# Patient Record
Sex: FEMALE | Race: ASIAN | NOT HISPANIC OR LATINO | Employment: FULL TIME | URBAN - METROPOLITAN AREA
[De-identification: names, ages, dates, MRNs, and addresses within clinical notes are randomized per-mention and may not be internally consistent; named-entity substitution may affect disease eponyms.]

---

## 2019-07-12 ENCOUNTER — OFFICE VISIT - HEALTHEAST (OUTPATIENT)
Dept: FAMILY MEDICINE | Facility: CLINIC | Age: 39
End: 2019-07-12

## 2019-07-12 ENCOUNTER — AMBULATORY - HEALTHEAST (OUTPATIENT)
Dept: FAMILY MEDICINE | Facility: CLINIC | Age: 39
End: 2019-07-12

## 2019-07-12 ENCOUNTER — COMMUNICATION - HEALTHEAST (OUTPATIENT)
Dept: FAMILY MEDICINE | Facility: CLINIC | Age: 39
End: 2019-07-12

## 2019-07-12 DIAGNOSIS — D64.9 ANEMIA, UNSPECIFIED TYPE: ICD-10-CM

## 2019-07-12 DIAGNOSIS — R42 DIZZINESS: ICD-10-CM

## 2019-07-12 DIAGNOSIS — R51.9 ACUTE NONINTRACTABLE HEADACHE, UNSPECIFIED HEADACHE TYPE: ICD-10-CM

## 2019-07-12 LAB
ANION GAP SERPL CALCULATED.3IONS-SCNC: 11 MMOL/L (ref 5–18)
BUN SERPL-MCNC: 9 MG/DL (ref 8–22)
CALCIUM SERPL-MCNC: 9.4 MG/DL (ref 8.5–10.5)
CHLORIDE BLD-SCNC: 106 MMOL/L (ref 98–107)
CO2 SERPL-SCNC: 23 MMOL/L (ref 22–31)
CREAT SERPL-MCNC: 0.67 MG/DL (ref 0.6–1.1)
ERYTHROCYTE [DISTWIDTH] IN BLOOD BY AUTOMATED COUNT: 17.8 % (ref 11–14.5)
ERYTHROCYTE [SEDIMENTATION RATE] IN BLOOD BY WESTERGREN METHOD: 13 MM/HR (ref 0–20)
FERRITIN SERPL-MCNC: 4 NG/ML (ref 10–130)
GFR SERPL CREATININE-BSD FRML MDRD: >60 ML/MIN/1.73M2
GLUCOSE BLD-MCNC: 122 MG/DL (ref 70–125)
HBA1C MFR BLD: 5.8 % (ref 3.5–6)
HCT VFR BLD AUTO: 31.7 % (ref 35–47)
HGB BLD-MCNC: 10.1 G/DL (ref 12–16)
IRON SATN MFR SERPL: 4 % (ref 20–50)
IRON SERPL-MCNC: 17 UG/DL (ref 42–175)
MCH RBC QN AUTO: 21.7 PG (ref 27–34)
MCHC RBC AUTO-ENTMCNC: 31.8 G/DL (ref 32–36)
MCV RBC AUTO: 68 FL (ref 80–100)
PLATELET # BLD AUTO: 273 THOU/UL (ref 140–440)
PMV BLD AUTO: 8.8 FL (ref 7–10)
POTASSIUM BLD-SCNC: 3.8 MMOL/L (ref 3.5–5)
RBC # BLD AUTO: 4.64 MILL/UL (ref 3.8–5.4)
SODIUM SERPL-SCNC: 140 MMOL/L (ref 136–145)
TIBC SERPL-MCNC: 430 UG/DL (ref 313–563)
TRANSFERRIN SERPL-MCNC: 344 MG/DL (ref 212–360)
TSH SERPL DL<=0.005 MIU/L-ACNC: 0.64 UIU/ML (ref 0.3–5)
WBC: 7.4 THOU/UL (ref 4–11)

## 2019-07-12 RX ORDER — FERROUS SULFATE 325(65) MG
1 TABLET ORAL
Qty: 30 TABLET | Refills: 2 | Status: SHIPPED | OUTPATIENT
Start: 2019-07-12 | End: 2024-02-05

## 2019-07-12 RX ORDER — MECLIZINE HYDROCHLORIDE 25 MG/1
12.5-25 TABLET ORAL 3 TIMES DAILY PRN
Qty: 30 TABLET | Refills: 0 | Status: SHIPPED | OUTPATIENT
Start: 2019-07-12

## 2019-07-12 ASSESSMENT — MIFFLIN-ST. JEOR: SCORE: 1074.59

## 2019-07-14 ENCOUNTER — COMMUNICATION - HEALTHEAST (OUTPATIENT)
Dept: FAMILY MEDICINE | Facility: CLINIC | Age: 39
End: 2019-07-14

## 2019-07-23 ENCOUNTER — OFFICE VISIT - HEALTHEAST (OUTPATIENT)
Dept: OCCUPATIONAL THERAPY | Facility: REHABILITATION | Age: 39
End: 2019-07-23

## 2019-07-23 DIAGNOSIS — R26.81 UNSTEADINESS ON FEET: ICD-10-CM

## 2019-07-23 DIAGNOSIS — Z78.9 DECREASED ACTIVITIES OF DAILY LIVING (ADL): ICD-10-CM

## 2019-07-23 DIAGNOSIS — R42 DIZZINESS: ICD-10-CM

## 2019-07-25 ENCOUNTER — COMMUNICATION - HEALTHEAST (OUTPATIENT)
Dept: FAMILY MEDICINE | Facility: CLINIC | Age: 39
End: 2019-07-25

## 2019-11-16 ENCOUNTER — AMBULATORY - HEALTHEAST (OUTPATIENT)
Dept: NURSING | Facility: CLINIC | Age: 39
End: 2019-11-16

## 2020-03-22 ENCOUNTER — VIRTUAL VISIT (OUTPATIENT)
Dept: FAMILY MEDICINE | Facility: OTHER | Age: 40
End: 2020-03-22

## 2020-03-22 NOTE — PROGRESS NOTES
"Date: 2020 11:24:51  Clinician: Kristen Lopez  Clinician NPI: [id number]  Patient: Danie Gunter  Patient : 1980  Patient Address[facility] [place] [id number]  Patient Phone: [phone]  Visit Protocol: URI  Patient Summary:  Danie is a 40 year old ( : 1980 ) female who initiated a Visit for cold, sinus infection, or influenza. When asked the question \"Please sign me up to receive news, health information and promotions from [facility].\", Danie responded \"No\".    Danie states her symptoms started 1-2 days ago.   Her symptoms consist of myalgia and a sore throat. She is experiencing mild difficulty breathing with activities but can speak normally in full sentences.   Symptom details   Sore throat: Danie reports having mild throat pain (1-3 on a 10 point pain scale), does not have exudate on her tonsils, and can swallow liquids. The lymph nodes in her neck are not enlarged. A rash has not appeared on the skin since the sore throat started.    Danie denies having ear pain, rhinitis, enlarged lymph nodes, facial pain or pressure, wheezing, cough, nasal congestion, malaise, chills, teeth pain, headache, and fever. She also denies taking antibiotic medication for the symptoms and having recent facial or sinus surgery in the past 60 days.   Precipitating events  Within the past week, Danie has not been exposed to someone with strep throat. She has not recently been exposed to someone with influenza. Danie has been in close contact with the following high risk individuals: adults 65 or older, children under the age of 5, and people with asthma, heart disease or diabetes.   Pertinent COVID-19 (Coronavirus) information  Danie has not traveled internationally or to the areas where COVID-19 (Coronavirus) is widespread, including cruise ship travel in the last 14 days before the start of her symptoms.   Danie has not had a close contact with a laboratory-confirmed COVID-19 patient within 14 days of symptom " onset. She also has not had a close contact with a suspected COVID-19 patient within 14 days of symptom onset.   Danie is not a healthcare worker and does not work in a healthcare facility.   Pertinent medical history  Danie does not get yeast infections when she takes antibiotics.   Danie needs a return to work/school note.   Weight: 120 lbs   Danie does not smoke or use smokeless tobacco.   She denies pregnancy and denies breastfeeding. She has menstruated in the past month.   Weight: 120 lbs    MEDICATIONS: Tylenol Extra Strength oral, ALLERGIES: NKDA  Clinician Response:  Dear Danie,  Based on the information provided, you have a viral upper respiratory infection, otherwise known as a cold. Symptoms vary from person to person, but can include sneezing, coughing, a runny nose, sore throat, and headache and range from mild to severe.  Unfortunately, there are no medications that can cure a cold, so treatment is focused on controlling symptoms as much as possible. Most people gradually feel better until symptoms are gone in 1-2 weeks.  Medication information  Because you have a viral infection, antibiotics will not help you get better. Treating a viral infection with antibiotics could actually make you feel worse.  Unless you are allergic to the over-the-counter medication(s) below, I recommend using:       Acetaminophen (Tylenol or store brand) oral tablet. Take 1-2 tablets by mouth every 4-6 hours to help with the discomfort.      Ibuprofen (Advil or store brand) 200 mg oral tablet. Take 1-3 tablets (200-600 mg) by mouth every 8 hours to help with the discomfort. Make sure to take the ibuprofen with food. Do not exceed 2400 mg in 24 hours.      Saline nasal spray (Bay or store brand). Use 1-2 sprays in each nostril 3 times a day as needed for congestion.     Over-the-counter medications do not require a prescription. Ask the pharmacist if you have any questions.  Self care  Steps you can take to be as comfortable as  possible:     Rest.    Drink plenty of water and other liquids.    Use throat lozenges.    Gargle with warm salt water (1/4 teaspoon of salt per 8 ounce glass of water).    Suck on frozen items such as popsicles or ice cubes.    Drink hot tea with lemon and honey.     When to seek care  Please be seen in a clinic or urgent care if new symptoms develop, or symptoms become worse.  Call 911 or go to the emergency room if you feel that your throat is closing off, you suddenly develop a rash, you are unable to swallow fluids, you are drooling, or you are having difficulty breathing.  Additional treatment plan   Based on the information you have provided, you do have symptoms that are consistent with Coronavirus (COVID-19).  The coronavirus causes mild to severe respiratory illness with the most common symptoms including fever, cough and difficulty breathing. Unfortunately, many viruses cause similar symptoms and it can be difficult to distinguish between viruses, especially in mild cases, so we are presuming that anyone with cough or fever has coronavirus at this time.  Coronavirus/COVID-19 has reached the point of community spread in [place], meaning that we are finding the virus in people with no known exposure risk for alvin the virus. Given the increasing commonness of coronavirus in the community we are no longer testing patients who are not critically ill.  If you are a health care worker, you should refer to your employee health office for instructions about testing and returning to work.  For everyone else who has cough or fever, you should assume you are infected with coronavirus. Since you will not be tested but have symptoms that may be consistent with coronavirus, the CDC recommends you stay in self-isolation until these three things have happened:    You have had no fever for at least 72 hours (that is three full days of no fever without the use of medicine that reduces fevers)    AND   Other  symptoms have improved (for example, when your cough or shortness of breath have improved)   AND   At least 7 days have passed since your symptoms first appeared.   How to Isolate:   Isolate yourself at home.  Do Not allow any visitors  Do Not go to work or school  Do Not go to [facility],  centers, shopping, or other public places.  Do Not shake hands.  Avoid close contact with others (hugging, kissing).   Protect Others:   Cover Your Mouth and Nose with a mask, disposable tissue or wash cloth to avoid spreading germs to others.  Wash your hands and face frequently with soap and water.   We know it can be scary to hear that you might have COVID-19. Our team can help track your symptoms and make sure you are doing ok over the next two weeks using a program called [employer] to keep in touch. When you receive an email from [employer], please consider enrolling in our monitoring program. There is no cost to you for monitoring. Here is a URL where you can learn more: [email or web address]  Managing Symptoms:   At this time, we primarily recommend Tylenol (Acetaminophen) for fever or pain. If you have liver or kidney problems, contact your primary care provider for instructions on use of tylenol. Adults can take 650 mg (two 325 mg pills) by mouth every 4-6 hours as needed OR 1,000 mg (two 500 mg pills) every 8 hours as needed. MAXIMUM DAILY DOSE: 3,000mg. For children, refer to dosing on bottle based on age or weight.   If you develop significant shortness of breath that prevents you from doing normal activities, please call 911 or proceed to the nearest emergency room and alert them immediately that you have been in self-isolation for possible coronavirus.  For more information about COVID19 and options for caring for yourself at home, please visit the CDC website at https://www.cdc.gov/coronavirus/2019-ncov/about/steps-when-sick.htmlFor more options for care at [facility], please visit our  website at [email or web address]    Diagnosis: Coronavirus infection, unspecified  Diagnosis ICD: B34.2

## 2020-08-13 ENCOUNTER — COMMUNICATION - HEALTHEAST (OUTPATIENT)
Dept: SCHEDULING | Facility: CLINIC | Age: 40
End: 2020-08-13

## 2021-05-30 NOTE — TELEPHONE ENCOUNTER
----- Message from Dorcas Rodas CNP sent at 7/12/2019  4:13 PM CDT -----  Please notify the patient that her hemoglobin is low. This can cause dizziness.  I would like her to start an iron supplement once daily.  I sent a prescription to the pharmacy.  Thanks.

## 2021-05-30 NOTE — TELEPHONE ENCOUNTER
Left message to call back for: unable to leave a message - states phone number is not in service  Information to relay to patient:  See message

## 2021-05-30 NOTE — TELEPHONE ENCOUNTER
Tried reaching patient to schedule phone number is not in service and contact is wrong. Please advice

## 2021-05-30 NOTE — PROGRESS NOTES
Assessment and Plan:     1. Dizziness  HM2(CBC w/o Differential)    Basic Metabolic Panel    Glycosylated Hemoglobin A1c    Thyroid Cascade    Sedimentation Rate    Ambulatory referral to PT/OT    meclizine (ANTIVERT) 25 mg tablet    fluticasone propionate (FLONASE) 50 mcg/actuation nasal spray   2. Anemia, unspecified type  Iron and Transferrin Iron Binding Capacity    Ferritin   3. Acute nonintractable headache, unspecified headache type       Differentials include benign paroxysmal positional vertigo,  eustachian tube dysfunction, anemia, thyroid disease, dehydration, diabetes.  Patient has small effusions present both ears.  Will treat with meclizine 3 times daily as needed.  She is to avoid taking this with other sedatives.  Also provided prescription for fluticasone nasal sprays to use daily.  Will refer to PT/OT for vestibular rehab.  Patient has a history of anemia.  Will check hemogram to rule out anemia.  We will rule out a electrolyte imbalance, diabetes, thyroid disease.  Patient has been experiencing a headache.  Discussed symptomatic treatment and importance of hydration.  Symptoms present typical of a tension headache.  Will check sed rate to rule out temporal arteritis.  If elevated, may consider MRI for further evaluation.  Patient states this is not the worst headache she is ever had.  Discussed concerning symptoms including worsening headache, vomiting, numbness and tingling of her extremities, muscular weakness, facial drooping.  If these occur, suggest ER evaluation.  She is content with the plan.    Subjective:     Danie is a 39 y.o. female presenting to the clinic for dizziness.  Patient states the dizziness developed last weekend.  She has had intermittent dizziness primarily with positional changes.  She complains of an unsteady gait.  The dizziness is worse after eating.  She has been experiencing fatigue.  Looking up or down makes her feel like she is going to faint.  She denies any recent  cold symptoms including rhinorrhea, postnasal drainage, cough, stomachache, nausea, vomiting, fever.  She has not had any ear pain or pressure.  She has not traveled recently.  She does have a history of headaches and complains of bilateral temporal discomfort.  Patient feels as though there is a band wrapped around her head.  This is not the worst headache she is ever had.  The headache has been intermittent and improves with Tylenol.  She has had nausea without vomiting.  She denies numbness and tingling of her extremities.  She has not had any muscular weakness.  She denies decreased visual acuity and photophobia. She has a history of anemia.     Review of Systems: A complete 14 point review of systems was obtained and is negative or as stated in the history of present illness.    Social History     Socioeconomic History     Marital status: Single     Spouse name: Not on file     Number of children: Not on file     Years of education: Not on file     Highest education level: Not on file   Occupational History     Not on file   Social Needs     Financial resource strain: Not on file     Food insecurity:     Worry: Not on file     Inability: Not on file     Transportation needs:     Medical: Not on file     Non-medical: Not on file   Tobacco Use     Smoking status: Never Smoker     Smokeless tobacco: Never Used   Substance and Sexual Activity     Alcohol use: Not on file     Drug use: Not on file     Sexual activity: Not on file   Lifestyle     Physical activity:     Days per week: Not on file     Minutes per session: Not on file     Stress: Not on file   Relationships     Social connections:     Talks on phone: Not on file     Gets together: Not on file     Attends [employer] service: Not on file     Active member of club or organization: Not on file     Attends meetings of clubs or organizations: Not on file     Relationship status: Not on file     Intimate partner violence:     Fear of current or ex partner: Not  "on file     Emotionally abused: Not on file     Physically abused: Not on file     Forced sexual activity: Not on file   Other Topics Concern     Not on file   Social History Narrative     Not on file       Active Ambulatory Problems     Diagnosis Date Noted     Anemia      GERD (gastroesophageal reflux disease) 01/21/2015     Group B streptococcal infection in pregnancy 02/04/2015     Pregnant 02/07/2015     Resolved Ambulatory Problems     Diagnosis Date Noted     Pregnancy      Breech presentation without mention of version, unspecified as to episode of care 11/15/2014     No Additional Past Medical History       No family history on file.    Objective:     /72   Pulse 90   Temp 98.1  F (36.7  C)   Ht 4' 8\" (1.422 m)   Wt 119 lb 6.4 oz (54.2 kg)   LMP 07/11/2019   SpO2 99%   BMI 26.77 kg/m      Patient is alert, in no obvious distress.   Skin: Warm, dry.  No lesions or rashes.  Skin turgor rapid return.   HEENT:  Head normocephalic, atraumatic.  Eyes normal.  PERRL.  EOM's intact.  No nystagmus. Ears small effusions are present in both ears.  Nose patent, mucosa pink.  Oropharynx mucosa pink.  No lesions or tonsillar enlargement.   Neck: Supple, no lymphadenopathy, JVD, bruits noted.  No thyromegaly.  Lungs:  Clear to auscultation. Respirations even and unlabored.  No wheezing or rales noted.   Heart:  Regular rate and rhythm.  No murmurs, S3, S4, gallops, or rubs.    Abdomen: Soft, nontender.  No organomegaly. Bowel sounds normoactive. No guarding or masses noted.   Musculoskeletal:  Full ROM of extremities.  DTRs symmetrical, sensations intact.  No obvious deformity.  Muscle strength equal +5/5.   Neurological:  Cranial nerves 2-12 intact.  Mikki hallpike maneuver is present bilaterally.               "

## 2021-05-30 NOTE — TELEPHONE ENCOUNTER
Can you send a letter to this patient?   If you do not have a template for this you can send it back and I will try to draft an appropriate letter

## 2021-05-30 NOTE — PROGRESS NOTES
[facility] Discharge Summary  Patient Name: Danie Gunter  Date: 10/15/2019  Referral Diagnosis: dizziness  Referring provider: Dorcas Rodas CNP  Visit Diagnosis:   1. Dizziness     2. Unsteadiness on feet     3. Decreased activities of daily living (ADL)         Goal status: not met    Patient was seen for 1 visit and referred back to PCP. The patient will need a new referral to resume.    Thank you for your referral.  Nae Comer  10/15/2019   7:55 AM    [facility]   Vestibular Initial Evaluation    Patient Name: Danie Gunter  Date of evaluation: 7/23/2019  Referral Diagnosis: Dizziness  Referring provider: Dorcas Rodas CNP  Visit Diagnosis:     ICD-10-CM    1. Dizziness R42    2. Unsteadiness on feet R26.81    3. Decreased activities of daily living (ADL) R68.89        Assessment:      Patient has symptoms with unclear etiology. She has soft central signs on oculomotor testing and complains of pressure in her head and headache. She has symptoms on positional tests but no nystagmus. Because of the abnormal oculomotor tests, recommend she return to see her PCP before treating with vestibular rehab.    Goals:  Patient will bend: to dress;without dizziness;without loss of balance;in 12 weeks  Patient able to perform bed mobility: without vertigo;in 12 weeks  Patient will turn head: without dizziness;for conversation;in 12 weeks  Patient will look up / down: without dizziness;for drinking;in 12 weeks      Patient's expectations/goals are realistic.    Barriers to Learning or Achieving Goals:  No Barriers.       Plan / Patient Instructions:        Plan of Care:   Communication with: Referral Source  Patient Related Instruction: Nature of Condition;Treatment plan and rationale;Basis of treatment;Expected outcome  Times per Week: 1  Number of Weeks: 12  Number of Visits: 12  Neuromuscular Reeducation: vestibular  Canolith Repostioning:           Subjective:         History of Present  Illness:    Danie is a 39 y.o. female who presents to therapy today with complaints of dizziness, headache, high pitch tinnitus and nausea. Patient had sudden onset of symptoms in 2019. Symptoms are not improving. She denies history of similar symptoms. Patient denies ear pain. Patient denies hearing changes. Functional limitations are described as occurring with balance, bending, bed mobility, head turns, looking up or down, stepping over curbs.     Pain Ratin       Objective:      Note: Items left blank indicates the item was not performed or not indicated at the time of the evaluation.    Patient Outcome Measures:   Dizziness Handicap Inventory  Score in %: 42       Vestibular Disorder Examination  1. Dizziness     2. Unsteadiness on feet     3. Decreased activities of daily living (ADL)         Precautions/Restrictions: None    Posture Observation: General standing posture is normal.    ROM:  Not Tested    Strength: Not Tested    Sensation: Patient reports normal sensation    Functional Mobility: good      Modified CTSIB:   Normal Surface Eyes Open-mild sway  Normal Surface Eyes Closed-moderate sway  Perturbed Surface Eyes Open-moderate sway  Perturbed Surface Eyes Closed-severe sway    The remainder of the tests are performed in room light.    Oculomotor Assessment:  Spontaneous Nystagmus with fixation: None  Spontaneous Nystagmus without fixation: NT  Gaze-evoked nystagmus: normal but patient had difficulty performing  Smooth pursuit: corrective saccades in both horizontal and vertical directions  Saccades: undershoots on all four endpoints  VOR to slow movement: patient is very dizzy  VOR Head Thrust: NT  VOR Cancellation: NT  OPK's: NT  Static Visual Acuity: NT  Dynamic Visual Acuity: NT    Positional Tests:  Hallpike Right:  Negative  Hallpike Left:  Negative  Head Roll Right: Negative  Head Roll Left:  Negative    Plan for next visit: to be determined    Treatment Today:  Evaluation only. Refer back  to PCP.  TREATMENT MINUTES COMMENTS   Evaluation 20    Self-care/ Home management     Neuromuscular Re-education     Canalith repositioning procedure           Total 20    Blank areas are intentional and mean the treatment did not include these items.     GOALS AND PLAN OF CARE WERE ESTABLISHED IN COOPERATION WITH THE PATIENT    OT Evaluation Code: (Please list factors)   Comorbidities:   Patient Active Problem List   Diagnosis     Anemia     GERD (gastroesophageal reflux disease)     Group B streptococcal infection in pregnancy     Pregnant       Profile/History Review: Brief    Need for eval modification: No    # Treatment options: Limited    Clinical Decision Making:  Low      Occupational Profile/ Medical and Therapy History and Comorbidities Occupational Performance Clinical Decision Making   (Complexity)   brief history with review of medical/therapy records related to the presenting problem.  No comorbidities 1-3 Performance deficits that result in activity limitations and/or participation restrictions.    No Assessment Modification  Low complexity, which includes  problem-focused assessments, and consideration of a limited number of treatment options.      expanded review of medical/therapy records and additional review of physical, cognitive and psychosocial history.    May have comorbidities 3-5 Performance deficits that result in activity limitations and/or participation restrictions.    Minimal to moderate modification of assessment Moderate complexity, which includes analysis of data from detailed assessments, and consideration of several treatment options.         Review of medical/therapy records and extensive additional review of physical, cognitive and psychosocial history.  Comorbidities affect occupational performance 5 or more Performance deficits that result in activity limitations and/or participation restrictions.    Significant modification of assessment High complexity, analysis of   Occupational profile and data,  Comprehensive assessments, multiple treatment options.            Nae Comer  7/23/2019  11:24 AM

## 2021-05-30 NOTE — TELEPHONE ENCOUNTER
----- Message from Cass Ricketts CMA sent at 7/25/2019  8:19 AM CDT -----      ----- Message -----  From: Dorcas Rodas CNP  Sent: 7/24/2019   6:34 PM  To: Dorcas Rodas Care Team Pool    Can you please assist her with scheduling a f/u appt with me to evaluate this further?  Thanks!   ----- Message -----  From: Nae Comer OT  Sent: 7/23/2019  11:51 AM  To: THEA Gaines,    I saw Danie this morning. She has an unclear presentation of symptoms. She had negative positional tests for BPPV today.(dizziness on both sides but no nystagmus) Normally, I might treat based on symptoms but she had several oculomotor test abnormalities indicating possible central involvement. Her biggest complaint today is the pressure in her head and headache. I suggested that she return to you. I contemplated exercises but she couldn't even turn her head to look at me during the evaluation and I noticed that her hands looked almost like they had a tremor.     Let me know what you  Think. i'm happy to see her back and try a couple exercises but thought you'd want these results first.      Thank you,   Sherri Comer, RAJWINDERR/L

## 2021-06-03 VITALS — WEIGHT: 119.4 LBS | BODY MASS INDEX: 26.86 KG/M2 | HEIGHT: 56 IN

## 2021-06-05 ENCOUNTER — RECORDS - HEALTHEAST (OUTPATIENT)
Dept: FAMILY MEDICINE | Facility: CLINIC | Age: 41
End: 2021-06-05

## 2021-06-05 DIAGNOSIS — Z33.1 PREGNANT STATE, INCIDENTAL: ICD-10-CM

## 2021-06-10 NOTE — TELEPHONE ENCOUNTER
She is going to have her  bring her to the ER instead of calling 911. She has had vertigo for two days. She has numbness on the side of her face that goes down to her chin. She has fallen twice. I told her to call 911.  I told her at the very least she needs to get to the ER immediately because she could be having a stroke..  Araceli Abdi RN  [facility]      Reason for Disposition    [1] Numbness (i.e., loss of sensation) of the face, arm or leg on one side of the body AND [2] sudden onset AND [3] present now    Additional Information    Negative: [1] Weakness (i.e., paralysis, loss of muscle strength) of the face, arm or leg on one side of the body AND [2] sudden onset AND [3] present now    Protocols used: DIZZINESS - VERTIGO-A-AH

## 2021-06-16 PROBLEM — R42 VERTIGO: Status: ACTIVE | Noted: 2020-08-13

## 2021-06-19 NOTE — LETTER
Letter by Dorcas Rodas CNP at      Author: Dorcas Rodas CNP Service: -- Author Type: --    Filed:  Encounter Date: 7/14/2019 Status: (Other)         Danie Gunter  [address]  [facility] [address]             July 14, 2019         Dear Ms. Gunter,    Below are the results from your recent visit:    Resulted Orders   HM2(CBC w/o Differential)   Result Value Ref Range    WBC 7.4 4.0 - 11.0 thou/uL    RBC 4.64 3.80 - 5.40 mill/uL    Hemoglobin 10.1 (L) 12.0 - 16.0 g/dL    Hematocrit 31.7 (L) 35.0 - 47.0 %    MCV 68 (L) 80 - 100 fL    MCH 21.7 (L) 27.0 - 34.0 pg    MCHC 31.8 (L) 32.0 - 36.0 g/dL    RDW 17.8 (H) 11.0 - 14.5 %    Platelets 273 140 - 440 thou/uL    MPV 8.8 7.0 - 10.0 fL   Basic Metabolic Panel   Result Value Ref Range    Sodium 140 136 - 145 mmol/L    Potassium 3.8 3.5 - 5.0 mmol/L    Chloride 106 98 - 107 mmol/L    CO2 23 22 - 31 mmol/L    Anion Gap, Calculation 11 5 - 18 mmol/L    Glucose 122 70 - 125 mg/dL    Calcium 9.4 8.5 - 10.5 mg/dL    BUN 9 8 - 22 mg/dL    Creatinine 0.67 0.60 - 1.10 mg/dL    GFR MDRD Af Amer >60 >60 mL/min/1.73m2    GFR MDRD Non Af Amer >60 >60 mL/min/1.73m2    Narrative    Fasting Glucose reference range is 70-99 mg/dL per  American Diabetes Association (ADA) guidelines.   Glycosylated Hemoglobin A1c   Result Value Ref Range    Hemoglobin A1c 5.8 3.5 - 6.0 %   Thyroid Cascade   Result Value Ref Range    TSH 0.64 0.30 - 5.00 uIU/mL   Sedimentation Rate   Result Value Ref Range    Sed Rate 13 0 - 20 mm/hr   Iron and Transferrin Iron Binding Capacity   Result Value Ref Range    Iron 17 (L) 42 - 175 ug/dL    Transferrin 344 212 - 360 mg/dL    Transferrin Saturation, Calculated 4 (L) 20 - 50 %    Transferrin IBC, Calculated 430 313 - 563 ug/dL   Ferritin   Result Value Ref Range    Ferritin 4 (L) 10 - 130 ng/mL       Your iron is low.  This can cause fatigue and dizziness.  I recommend that you take an iron supplement once daily.  Lt's recheck your iron levels in 3 months.      Secondly your A1C (average blood sugar over 3 months) places you in the prediabetes range.  This means that if you continue on your path, you may develop diabetes in the future.  I recommend you consume a healthy diet (avoid white breads, refined carbohydrates, sweets) and exercise.       Please call with questions or contact us using [employer].    Sincerely,        Electronically signed by Dorcas Rodas CNP

## 2021-06-19 NOTE — LETTER
Letter by Dorcas Rodas CNP at      Author: Dorcas Rodas CNP Service: -- Author Type: --    Filed:  Encounter Date: 7/25/2019 Status: (Other)         Danie Gunter  [address]  [facility] [address]      July 26, 2019      Dear Danie,    We have attempted to contact you to schedule a follow-up appointment with Dorcas Rodas CNP at the [facility].     Please call [phone] to schedule your appointment.    Your health is important to us.  Please call our office as soon as possible so that we may schedule your appointment.  If you have already scheduled your appointment, please disregard this letter.      Sincerely,     [facility]

## 2021-06-19 NOTE — LETTER
Letter by Dorcas Rodas CNP at      Author: Dorcas Rodas CNP Service: -- Author Type: --    Filed:  Encounter Date: 7/25/2019 Status: (Other)         Danie Gunter  [address]  [facility] [address]      July 26, 2019      Dear Dorcas Pardo CNP    Sincerely,        Electronically signed by Dorcas Rodas CNP

## 2021-06-19 NOTE — LETTER
Letter by Dorcas Rodas CNP at      Author: Dorcas Rodas CNP Service: -- Author Type: --    Filed:  Encounter Date: 7/25/2019 Status: (Other)         Danie Gunter  [address]  [facility] [address]      July 26, 2019      Dear Danie,    We have attempted to contact you to schedule a follow-up appointment with Dorcas Rodas CNP at the [facility].     Please call [phone] to schedule your appointment.    Your health is important to us.  Please call our office as soon as possible so that we may schedule your appointment.  If you have already scheduled your appointment, please disregard this letter.      Sincerely,     [facility]

## 2024-02-04 ENCOUNTER — HOSPITAL ENCOUNTER (EMERGENCY)
Facility: CLINIC | Age: 44
Discharge: HOME | End: 2024-02-05
Attending: FAMILY MEDICINE | Admitting: FAMILY MEDICINE
Payer: COMMERCIAL

## 2024-02-04 DIAGNOSIS — R40.4 UNRESPONSIVE EPISODE: ICD-10-CM

## 2024-02-04 DIAGNOSIS — D50.9 IRON DEFICIENCY ANEMIA, UNSPECIFIED IRON DEFICIENCY ANEMIA TYPE: ICD-10-CM

## 2024-02-04 LAB
ABO/RH(D): NORMAL
ANION GAP SERPL CALCULATED.3IONS-SCNC: 9 MMOL/L (ref 7–15)
ANTIBODY SCREEN: NEGATIVE
BASOPHILS # BLD AUTO: 0 10E3/UL (ref 0–0.2)
BASOPHILS NFR BLD AUTO: 0 %
BUN SERPL-MCNC: 10.6 MG/DL (ref 6–20)
CALCIUM SERPL-MCNC: 8.6 MG/DL (ref 8.6–10)
CHLORIDE SERPL-SCNC: 107 MMOL/L (ref 98–107)
CREAT SERPL-MCNC: 0.45 MG/DL (ref 0.51–0.95)
DEPRECATED HCO3 PLAS-SCNC: 25 MMOL/L (ref 22–29)
EGFRCR SERPLBLD CKD-EPI 2021: >90 ML/MIN/1.73M2
EOSINOPHIL # BLD AUTO: 0 10E3/UL (ref 0–0.7)
EOSINOPHIL NFR BLD AUTO: 1 %
ERYTHROCYTE [DISTWIDTH] IN BLOOD BY AUTOMATED COUNT: 21.8 % (ref 10–15)
GLUCOSE SERPL-MCNC: 105 MG/DL (ref 70–99)
HCT VFR BLD AUTO: 25.1 % (ref 35–47)
HGB BLD-MCNC: 7 G/DL (ref 11.7–15.7)
HOLD SPECIMEN: NORMAL
IMM GRANULOCYTES # BLD: 0 10E3/UL
IMM GRANULOCYTES NFR BLD: 0 %
LYMPHOCYTES # BLD AUTO: 2 10E3/UL (ref 0.8–5.3)
LYMPHOCYTES NFR BLD AUTO: 32 %
MCH RBC QN AUTO: 16.5 PG (ref 26.5–33)
MCHC RBC AUTO-ENTMCNC: 27.9 G/DL (ref 31.5–36.5)
MCV RBC AUTO: 59 FL (ref 78–100)
MONOCYTES # BLD AUTO: 0.3 10E3/UL (ref 0–1.3)
MONOCYTES NFR BLD AUTO: 5 %
NEUTROPHILS # BLD AUTO: 3.9 10E3/UL (ref 1.6–8.3)
NEUTROPHILS NFR BLD AUTO: 62 %
NRBC # BLD AUTO: 0 10E3/UL
NRBC BLD AUTO-RTO: 0 /100
PLATELET # BLD AUTO: 329 10E3/UL (ref 150–450)
POTASSIUM SERPL-SCNC: 3.3 MMOL/L (ref 3.4–5.3)
RBC # BLD AUTO: 4.23 10E6/UL (ref 3.8–5.2)
SODIUM SERPL-SCNC: 141 MMOL/L (ref 135–145)
SPECIMEN EXPIRATION DATE: NORMAL
TSH SERPL DL<=0.005 MIU/L-ACNC: 1.25 UIU/ML (ref 0.3–4.2)
WBC # BLD AUTO: 6.3 10E3/UL (ref 4–11)

## 2024-02-04 PROCEDURE — 85045 AUTOMATED RETICULOCYTE COUNT: CPT | Performed by: EMERGENCY MEDICINE

## 2024-02-04 PROCEDURE — 96361 HYDRATE IV INFUSION ADD-ON: CPT | Performed by: FAMILY MEDICINE

## 2024-02-04 PROCEDURE — 99284 EMERGENCY DEPT VISIT MOD MDM: CPT | Mod: 25 | Performed by: FAMILY MEDICINE

## 2024-02-04 PROCEDURE — 85025 COMPLETE CBC W/AUTO DIFF WBC: CPT | Performed by: FAMILY MEDICINE

## 2024-02-04 PROCEDURE — 83550 IRON BINDING TEST: CPT | Performed by: EMERGENCY MEDICINE

## 2024-02-04 PROCEDURE — 250N000011 HC RX IP 250 OP 636: Performed by: FAMILY MEDICINE

## 2024-02-04 PROCEDURE — 258N000003 HC RX IP 258 OP 636: Performed by: FAMILY MEDICINE

## 2024-02-04 PROCEDURE — 99284 EMERGENCY DEPT VISIT MOD MDM: CPT | Performed by: FAMILY MEDICINE

## 2024-02-04 PROCEDURE — 96374 THER/PROPH/DIAG INJ IV PUSH: CPT | Performed by: FAMILY MEDICINE

## 2024-02-04 PROCEDURE — 82728 ASSAY OF FERRITIN: CPT | Performed by: EMERGENCY MEDICINE

## 2024-02-04 PROCEDURE — 86900 BLOOD TYPING SEROLOGIC ABO: CPT | Performed by: FAMILY MEDICINE

## 2024-02-04 PROCEDURE — 84443 ASSAY THYROID STIM HORMONE: CPT | Performed by: FAMILY MEDICINE

## 2024-02-04 PROCEDURE — 36415 COLL VENOUS BLD VENIPUNCTURE: CPT | Performed by: FAMILY MEDICINE

## 2024-02-04 PROCEDURE — 80048 BASIC METABOLIC PNL TOTAL CA: CPT | Performed by: FAMILY MEDICINE

## 2024-02-04 RX ORDER — LORAZEPAM 2 MG/ML
0.5 INJECTION INTRAMUSCULAR ONCE
Status: COMPLETED | OUTPATIENT
Start: 2024-02-04 | End: 2024-02-04

## 2024-02-04 RX ADMIN — SODIUM CHLORIDE 500 ML: 9 INJECTION, SOLUTION INTRAVENOUS at 21:09

## 2024-02-04 RX ADMIN — LORAZEPAM 0.5 MG: 2 INJECTION INTRAMUSCULAR; INTRAVENOUS at 21:09

## 2024-02-04 ASSESSMENT — ACTIVITIES OF DAILY LIVING (ADL)
ADLS_ACUITY_SCORE: 35
ADLS_ACUITY_SCORE: 35

## 2024-02-05 VITALS
TEMPERATURE: 98.4 F | RESPIRATION RATE: 17 BRPM | SYSTOLIC BLOOD PRESSURE: 148 MMHG | OXYGEN SATURATION: 100 % | DIASTOLIC BLOOD PRESSURE: 97 MMHG | HEART RATE: 77 BPM

## 2024-02-05 LAB
FERRITIN SERPL-MCNC: 3 NG/ML (ref 6–175)
IRON BINDING CAPACITY (ROCHE): 390 UG/DL (ref 240–430)
IRON SATN MFR SERPL: 3 % (ref 15–46)
IRON SERPL-MCNC: 11 UG/DL (ref 37–145)
RETICS # AUTO: 0.04 10E6/UL (ref 0.03–0.1)
RETICS/RBC NFR AUTO: 0.9 % (ref 0.5–2)

## 2024-02-05 RX ORDER — FERROUS SULFATE 325(65) MG
325 TABLET ORAL
Qty: 30 TABLET | Refills: 0 | Status: SHIPPED | OUTPATIENT
Start: 2024-02-05

## 2024-02-05 ASSESSMENT — ACTIVITIES OF DAILY LIVING (ADL)
ADLS_ACUITY_SCORE: 35
ADLS_ACUITY_SCORE: 35

## 2024-02-05 NOTE — DISCHARGE INSTRUCTIONS
Take the iron daily to help increase your blood levels.  Return to the emergency department if you are feeling ill or have worsening symptoms or if you would like to talk to a therapist.  Otherwise follow-up in primary clinic for recheck.

## 2024-02-05 NOTE — ED NOTES
Bed: ED25  Expected date: 2/4/24  Expected time: 8:53 PM  Means of arrival: Ambulance ([facility] 4272)  Comments:

## 2024-02-05 NOTE — ED PROVIDER NOTES
"  HPI   Patient is a 44-year-old female presenting with catatonia.  The patient does not have significant past medical history.  According to our chart, she was last seen in 2020 for vertigo.  Unknown if she is abusing substances.  No corroborating history from family or friends available.  EMS provides the current history as the patient is not talking.  They tell me that the patient comes from \"a very Rest[employer] or spiritual family.\"  They tell me that there was a murder in their home and it is a known phenomenon that family members well have similar catatonic symptoms in the past.  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  The patient has been uncommunicative in route.  No obvious distress per their account.  No vomiting.  No evidence of trauma on her person.  No other history obtained from family.      Allergies:  No Known Allergies  Problem List:    Patient Active Problem List    Diagnosis Date Noted    Vertigo 08/13/2020     Priority: Medium    Pregnant 02/07/2015     Priority: Medium    Anemia      Priority: Medium     Created by Conversion        Group B streptococcal infection in pregnancy 02/04/2015     Priority: Medium    GERD (gastroesophageal reflux disease) 01/21/2015     Priority: Medium      Past Medical History:    Past Medical History:   Diagnosis Date    Anemia     GERD (gastroesophageal reflux disease)      Past Surgical History:    Past Surgical History:   Procedure Laterality Date    POSTPARTUM TUBAL LIGATION Bilateral 2/8/2015    Procedure: TUBAL LIGATION POST PARTUM;  Surgeon: Heather Holder MD;  Location: [facility];  Service:      Family History:    No family history on file.  Social History:  Marital Status:  Single [1]  Social History     Tobacco Use    Smoking status: Never    Smokeless tobacco: Never      Medications:    ferrous sulfate (FEROSUL) 325 (65 Fe) MG tablet  meclizine " (ANTIVERT) 25 mg tablet  meclizine (ANTIVERT) 25 mg tablet      Review of Systems   Unable to perform ROS: Other       PE   BP: (!) 136/93  Pulse: 88  Temp: 98.4  F (36.9  C)  Resp: 18  SpO2: 100 %  Physical Exam  Vitals reviewed.   Constitutional:       Appearance: She is well-developed.      Comments: Patient is moving her eyes about the room, seemingly looking at things with purpose.  When I asked her to open her mouth she  her lip slightly, as if it was difficult to do so and she could not completely open her mouth.  She was not able to respond to me verbally or blink or move her extremities or her head in response to my questions.   HENT:      Head: Normocephalic and atraumatic.      Right Ear: External ear normal.      Left Ear: External ear normal.      Nose: Nose normal.   Eyes:      Extraocular Movements: Extraocular movements intact.      Conjunctiva/sclera: Conjunctivae normal.      Pupils: Pupils are equal, round, and reactive to light.   Cardiovascular:      Rate and Rhythm: Normal rate and regular rhythm.   Pulmonary:      Effort: Pulmonary effort is normal.      Breath sounds: Normal breath sounds.   Abdominal:      Palpations: Abdomen is soft.      Tenderness: There is no abdominal tenderness.   Musculoskeletal:         General: Normal range of motion.      Cervical back: Neck supple.   Skin:     General: Skin is warm and dry.   Neurological:      Mental Status: She is alert.      Comments: See above.  Not cooperative or unable to cooperate with my examination.   Psychiatric:         Behavior: Behavior normal.         ED COURSE and [facility] [place]   2108.  Patient has symptoms and signs as described above.  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  The details of why this is the case is not entirely known to me but as above we do know that mother family members have had similar presentations.  Blood work pending.  Lorazepam ordered.  Small fluid bolus with normal saline.    2230.   Patient responding to questions.  She is definitely improved compared to presentation.  Blood work shows anemia with hemoglobin of 7.  She was able to answer questions about this.  She knows that she has anemia at baseline.  She denies recent source of hemorrhage, specifically she denies hematuria, hematochezia or melena, epistaxis, bleeding from the gums, skin rash, or vaginal bleeding.  We had a discussion about transfusion and she is refusing.  She is adamant that she is without symptoms and she does not want to except a transfusion or other risks that go along with it.  She is not suicidal.  She is not homicidal.      Electronic medical chart reviewed, including medical problems, medications, medical allergies, social history.  Recent hospitalizations and surgical procedures reviewed.  Recent clinic visits and consultations reviewed.  Recent labs and test results reviewed.  Nursing notes reviewed.    The patient, their parent if applicable, and/or their medical decision maker(s) and I have reviewed all of the available historical information, applicable PMH, physical exam findings, and objective diagnostic data gathered during this ED visit.  We then discussed all work-up options and then together agreed upon the course taken during this visit.  The ultimate disposition and plan was a cooperative decision made between myself and the patient, their parent if applicable, and/or their legal decision maker(s).  The risks and benefits of all decisions made during this visit were discussed to the best of my abilities given the circumstances, and all parties are understanding of the pertinent ramifications of these decisions.      LABS  Labs Ordered and Resulted from Time of ED Arrival to Time of ED Departure   BASIC METABOLIC PANEL - Abnormal       Result Value    Sodium 141      Potassium 3.3 (*)     Chloride 107      Carbon Dioxide (CO2) 25      Anion Gap 9      Urea Nitrogen 10.6      Creatinine 0.45 (*)     GFR  Estimate >90      Calcium 8.6      Glucose 105 (*)    CBC WITH PLATELETS AND DIFFERENTIAL - Abnormal    WBC Count 6.3      RBC Count 4.23      Hemoglobin 7.0 (*)     Hematocrit 25.1 (*)     MCV 59 (*)     MCH 16.5 (*)     MCHC 27.9 (*)     RDW 21.8 (*)     Platelet Count 329      % Neutrophils 62      % Lymphocytes 32      % Monocytes 5      % Eosinophils 1      % Basophils 0      % Immature Granulocytes 0      NRBCs per 100 WBC 0      Absolute Neutrophils 3.9      Absolute Lymphocytes 2.0      Absolute Monocytes 0.3      Absolute Eosinophils 0.0      Absolute Basophils 0.0      Absolute Immature Granulocytes 0.0      Absolute NRBCs 0.0     FERRITIN - Abnormal    Ferritin 3 (*)    IRON AND IRON BINDING CAPACITY - Abnormal    Iron 11 (*)     Iron Binding Capacity 390      Iron Sat Index 3 (*)    TSH WITH FREE T4 REFLEX - Normal    TSH 1.25     RETICULOCYTE COUNT - Normal    % Reticulocyte 0.9      Absolute Reticulocyte 0.038     TYPE AND SCREEN, ADULT    ABO/RH(D) O POS      Antibody Screen Negative      SPECIMEN EXPIRATION DATE [id number]     ABO/RH TYPE AND SCREEN       IMAGING  Images reviewed by me.  Radiology report also reviewed.  No orders to display       Procedures    Medications   LORazepam (ATIVAN) injection 0.5 mg (0.5 mg Intravenous $Given 2/4/24 2109)   sodium chloride 0.9% BOLUS 500 mL (0 mLs Intravenous Stopped 2/5/24 0030)         IMPRESSION       ICD-10-CM    1. Unresponsive episode  R40.4 Adult Mental [facility] Referral      2. Iron deficiency anemia, unspecified iron deficiency anemia type  D50.9 Primary Care Referral               Medication List        Modified      ferrous sulfate 325 (65 Fe) MG tablet  Commonly known as: FEROSUL  325 mg, Oral, DAILY WITH BREAKFAST  What changed: Another medication with the same name was removed. Continue taking this medication, and follow the directions you see here.                            Fransico Harvey MD  02/07/24 0107

## 2024-02-05 NOTE — ED PROVIDER NOTES
Emergency department signout note    This is a 44-year-old female who was initially seen by Dr. Harvey for unresponsiveness.  Thought to be related to mental health.  Apparently the family was performing what sounds like a Bahai ceremony to expel spirits from their house.  The patient became unresponsive during this and was brought here for further evaluation.  She does reply to some questioning but otherwise just is resting on the hospital gurney without signs of distress with normal vital signs.    I went and evaluated the patient this morning, she is awake and talking.  She says that she felt as if this spirit had taking hold of her body and it was all of her energy to expel it from her with the help of the holy water and praying at their house.  She says afterwards she became so tired that she could not respond to people and could not move and just had to lay there.  She says that she feels much better now and is denying complaints.  She denies headache, fevers, chest pain, or trouble breathing.  She says she would like to go home.    I offered the patient evaluation by DEC but the patient declined stating that she has a therapist and a  and does not need more help at this time.  She is anemic and her iron studies show iron deficiency anemia.  I will start the patient on iron and she is safe to discharge with instructions to return if she has worsening of her symptoms or other concerns, otherwise follow-up in clinic.  I have placed a referral for primary care and for mental health in case the patient changes her mind.  The patient is in agreement with this plan.     Bernardo Miller MD  02/05/24 4374

## 2025-04-19 ENCOUNTER — HEALTH MAINTENANCE LETTER (OUTPATIENT)
Age: 45
End: 2025-04-19